# Patient Record
Sex: MALE | HISPANIC OR LATINO | Employment: UNEMPLOYED | URBAN - METROPOLITAN AREA
[De-identification: names, ages, dates, MRNs, and addresses within clinical notes are randomized per-mention and may not be internally consistent; named-entity substitution may affect disease eponyms.]

---

## 2022-09-05 ENCOUNTER — HOSPITAL ENCOUNTER (EMERGENCY)
Facility: MEDICAL CENTER | Age: 37
End: 2022-09-05
Attending: EMERGENCY MEDICINE
Payer: COMMERCIAL

## 2022-09-05 VITALS
BODY MASS INDEX: 25.71 KG/M2 | HEART RATE: 68 BPM | TEMPERATURE: 98.6 F | HEIGHT: 71 IN | DIASTOLIC BLOOD PRESSURE: 63 MMHG | RESPIRATION RATE: 16 BRPM | SYSTOLIC BLOOD PRESSURE: 113 MMHG | OXYGEN SATURATION: 98 % | WEIGHT: 183.64 LBS

## 2022-09-05 DIAGNOSIS — Z20.2 STD EXPOSURE: ICD-10-CM

## 2022-09-05 LAB
C TRACH DNA SPEC QL NAA+PROBE: NEGATIVE
N GONORRHOEA DNA SPEC QL NAA+PROBE: NEGATIVE
SPECIMEN SOURCE: NORMAL

## 2022-09-05 PROCEDURE — 96372 THER/PROPH/DIAG INJ SC/IM: CPT

## 2022-09-05 PROCEDURE — A9270 NON-COVERED ITEM OR SERVICE: HCPCS | Performed by: EMERGENCY MEDICINE

## 2022-09-05 PROCEDURE — 87591 N.GONORRHOEAE DNA AMP PROB: CPT

## 2022-09-05 PROCEDURE — 700102 HCHG RX REV CODE 250 W/ 637 OVERRIDE(OP): Performed by: EMERGENCY MEDICINE

## 2022-09-05 PROCEDURE — 99284 EMERGENCY DEPT VISIT MOD MDM: CPT

## 2022-09-05 PROCEDURE — 87491 CHLMYD TRACH DNA AMP PROBE: CPT

## 2022-09-05 PROCEDURE — 700111 HCHG RX REV CODE 636 W/ 250 OVERRIDE (IP): Performed by: EMERGENCY MEDICINE

## 2022-09-05 RX ORDER — DOXYCYCLINE 100 MG/1
100 CAPSULE ORAL 2 TIMES DAILY
Qty: 14 CAPSULE | Refills: 0 | Status: SHIPPED | OUTPATIENT
Start: 2022-09-05 | End: 2022-09-12

## 2022-09-05 RX ORDER — CEFTRIAXONE 500 MG/1
500 INJECTION, POWDER, FOR SOLUTION INTRAMUSCULAR; INTRAVENOUS ONCE
Status: COMPLETED | OUTPATIENT
Start: 2022-09-05 | End: 2022-09-05

## 2022-09-05 RX ORDER — DOXYCYCLINE 100 MG/1
100 TABLET ORAL ONCE
Status: COMPLETED | OUTPATIENT
Start: 2022-09-05 | End: 2022-09-05

## 2022-09-05 RX ADMIN — PENICILLIN G BENZATHINE 2.4 MILLION UNITS: 1200000 INJECTION, SUSPENSION INTRAMUSCULAR at 13:00

## 2022-09-05 RX ADMIN — CEFTRIAXONE SODIUM 500 MG: 500 INJECTION, POWDER, FOR SOLUTION INTRAMUSCULAR; INTRAVENOUS at 12:43

## 2022-09-05 RX ADMIN — DOXYCYCLINE 100 MG: 100 TABLET, FILM COATED ORAL at 12:45

## 2022-09-05 ASSESSMENT — LIFESTYLE VARIABLES: DO YOU DRINK ALCOHOL: NO

## 2022-09-05 NOTE — ED TRIAGE NOTES
Chief Complaint   Patient presents with    Exposure to STD     Known syphilis exposure and reported hx.     Patient to triage via ambulation, with a steady gait, patient A&O x4.  Appropriate precautions in place.     Explained wait time and triage process to pt. Pt placed back in lobby, told to notify ED tech or triage RN of any changes, verbalized understanding.

## 2022-09-05 NOTE — ED PROVIDER NOTES
ED Provider Note    Scribed for Rashad Cruz M.D. by Linette Gutiérrez. 9/5/2022, 12:14 PM.    Primary care provider: Pcp Pt States None  Means of arrival: Walk-in  History obtained from: Patient  History limited by: None    CHIEF COMPLAINT  Chief Complaint   Patient presents with    Exposure to STD     Known syphilis exposure and reported hx.       HPI  Meek Wood is a 37 y.o. male who presents to the Emergency Department for further evaluation  after a known syphilis exposure 1 week ago. The patient states he has gotten syphilis before. He denies any lesions, sores, bumps, or dysuria. He notes that he wants to be tested for other STD's and treated for them too. He denies any other med problems. No alleviating or exacerbating factors noted.      REVIEW OF SYSTEMS  Review of Systems   Genitourinary:         Known  syphilis exposure   All other systems reviewed and are negative.    PAST MEDICAL HISTORY   has a past medical history of Syphilis (2021).    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 No pertinent family history.    CURRENT MEDICATIONS  Home Medications       Reviewed by Hemalatha Cano R.N. (Registered Nurse) on 09/05/22 at 1136  Med List Status: Partial     Medication Last Dose Status   cyclobenzaprine (FLEXERIL) 10 MG TABS  Active   hydrocodone-acetaminophen (NORCO) 5-325 MG TABS per tablet  Active                    ALLERGIES  No Known Allergies    PHYSICAL EXAM  VITAL SIGNS: /58  "  Pulse 71   Temp 37.1 °C (98.8 °F) (Temporal)   Resp 16   Ht 1.803 m (5' 11\")   Wt 83.3 kg (183 lb 10.3 oz)   SpO2 98%   BMI 25.61 kg/m²   Vitals reviewed.  Constitutional: Well developed, Well nourished, No acute distress, Non-toxic appearance.   HENT: Normocephalic, Atraumatic,  Eyes: PERRL, EOMI, Conjunctiva normal, No discharge.   Neck: Normal range of motion, No tenderness  Cardiovascular: Normal heart rate, Normal rhythm, No murmurs,  Thorax & Lungs: Normal breath sounds, No respiratory distress, No wheezing,  Musculoskeletal: Good range of motion in all major joints.   Neurologic: Alert, No focal deficits noted.   Psychiatric: Affect normal    Results for orders placed or performed during the hospital encounter of 09/05/22   Chlamydia/GC PCR (Urine)    Specimen: Genital   Result Value Ref Range    Source Urine         RADIOLOGY  No orders to display     The radiologist's interpretation of all radiological studies have been reviewed by me.    COURSE & MEDICAL DECISION MAKING  Pertinent Labs & Imaging studies reviewed. (See chart for details)      12:14 PM Patient seen and examined at bedside. The patient presents with a known syphilis exposure, and the differential diagnosis includes but is not limited to syphilis, possible other STDs.  Ordered for Chlamydi\a/GC PCR (Urine) to evaluate. Patient will be treated with Rocephin 500 mg injection and Adoxa 100 mg tablet for his symptoms.      Patient is worked up for STDs per protocol.  Discussed this with pharmacy recommend Rocephin, Doxy, and penicillin.  These are ordered and given here in the ED.  The patient be discharged with a prescription for doxycycline for 7 days.  He is to return for any new symptoms or concerns.  Follow-up primary care for recommends he gets tested for additional STDs.    Also counseled back to safe sex and have partners tested.  Questions are answered is agreeable to plan and discharged in good condition.     The patient will " return for new or worsening symptoms and is stable at the time of discharge.      DISPOSITION:  Patient will be discharged home in stable condition.    FOLLOW UP:  [facility]  [address]  [place] [address]  Schedule an appointment as soon as possible for a visit in 2 days      OUTPATIENT MEDICATIONS:  Discharge Medication List as of 9/5/2022  1:09 PM        START taking these medications    Details   doxycycline (MONODOX) 100 MG capsule Take 1 Capsule by mouth 2 times a day for 7 days., Disp-14 Capsule, R-0, Normal              FINAL IMPRESSION  1. STD exposure          Linette JHAVERI (Scribe), am scribing for, and in the presence of, Rashad Cruz M.D..    Electronically signed by: Linette Gutiérrez (Matt), 9/5/2022    Rashad JHAVERI M.D. personally performed the services described in this documentation, as scribed by Linette Gutiérrez in my presence, and it is both accurate and complete.    The note accurately reflects work and decisions made by me.  Rashad Cruz M.D.  9/5/2022  1:33 PM

## 2022-09-05 NOTE — DISCHARGE INSTRUCTIONS
Medications as prescribed.  Practice safe sex.  Return to the emergency room for any new symptoms or concerns.  Follow-up with your doctor.  Consider further outpatient test for things like hepatitis and HIV.  Have partners checked for STDs.

## 2022-09-17 ENCOUNTER — HOSPITAL ENCOUNTER (EMERGENCY)
Facility: MEDICAL CENTER | Age: 37
End: 2022-09-17
Attending: EMERGENCY MEDICINE
Payer: COMMERCIAL

## 2022-09-17 VITALS
DIASTOLIC BLOOD PRESSURE: 66 MMHG | HEART RATE: 81 BPM | TEMPERATURE: 97.6 F | BODY MASS INDEX: 26.48 KG/M2 | OXYGEN SATURATION: 99 % | SYSTOLIC BLOOD PRESSURE: 121 MMHG | HEIGHT: 71 IN | WEIGHT: 189.15 LBS | RESPIRATION RATE: 12 BRPM

## 2022-09-17 DIAGNOSIS — Z20.2 POSSIBLE EXPOSURE TO STD: ICD-10-CM

## 2022-09-17 LAB
HIV 1+2 AB+HIV1 P24 AG SERPL QL IA: NORMAL
T PALLIDUM AB SER QL IA: REACTIVE

## 2022-09-17 PROCEDURE — 99283 EMERGENCY DEPT VISIT LOW MDM: CPT

## 2022-09-17 PROCEDURE — 87389 HIV-1 AG W/HIV-1&-2 AB AG IA: CPT

## 2022-09-17 PROCEDURE — 87591 N.GONORRHOEAE DNA AMP PROB: CPT

## 2022-09-17 PROCEDURE — 86592 SYPHILIS TEST NON-TREP QUAL: CPT

## 2022-09-17 PROCEDURE — 36415 COLL VENOUS BLD VENIPUNCTURE: CPT

## 2022-09-17 PROCEDURE — 87491 CHLMYD TRACH DNA AMP PROBE: CPT

## 2022-09-17 PROCEDURE — 86593 SYPHILIS TEST NON-TREP QUANT: CPT

## 2022-09-17 PROCEDURE — 86780 TREPONEMA PALLIDUM: CPT

## 2022-09-17 NOTE — LETTER
9/23/2022               Meek Israel  [address]  [facility] [address]        Dear Meek (MR#[id number])    As we have been unable to contact you by phone, this letter is sent in regards to your, recent visit to the [facility] Emergency Department on 9/17/2022. During the visit, tests were performed to assist the physician in your medical diagnosis. A review of your tests requires that we notify you of the following:    Your culture test was POSITIVE for Syphilis , a sexually transmitted infection, with an RPR titer of 1:2.      Should your symptoms progress, it is important that you follow up with your primary care physician, your local urgent care office, or return to the emergency department for further work up in order to prevent long term health issues.      Additionally, patients who are HIV negative and have been diagnosed with a sexually transmitted infection (STI) in the past 6 months are considered for PrEP.  PrEP stands for Pre-Exposure Prophylaxis. It is a once-daily pill regimen that can help you stay HIV-negative. When taken as prescribed, PrEP has been shown to be safe and highly effective against mahamed HIV. While PrEP does not protect against other sexually transmitted infections or unwanted pregnancy, it can be paired with condoms and several other prevention strategies for additional protection. We have a clinic here in [facility] that can help you obtain this medication, if interested please contact the number below.       Thank you for your cooperation in the matter.    Sincerely,  ED Culture Follow-Up Staff  Wyatt Osullivan, PharmD  [phone]    [facility] Emergency Department  [address], [place] [address] [phone] (ED Culture Line)

## 2022-09-18 NOTE — ED NOTES
"Pt discharged home. Pt in possession of belongings. Pt provided discharge education and information pertaining to medications and follow up appointments. Pt received copy of discharge instructions and verbalized understanding. /66   Pulse 81   Temp 36.4 °C (97.6 °F) (Temporal)   Resp 12   Ht 1.803 m (5' 11\")   Wt 85.8 kg (189 lb 2.5 oz)   SpO2 99%   BMI 26.38 kg/m²     "

## 2022-09-18 NOTE — ED TRIAGE NOTES
".  Chief Complaint   Patient presents with    Exposure to STD     Reports he had a sexual partner with known syphilis. Endorses penile burning.        38 yo male ambulatory to triage for above complaint. STI protocol ordered.     /66   Pulse 81   Temp 36.4 °C (97.6 °F) (Temporal)   Resp 12   Ht 1.803 m (5' 11\")   Wt 85.8 kg (189 lb 2.5 oz)   SpO2 99%   BMI 26.38 kg/m²     "

## 2022-09-18 NOTE — ED PROVIDER NOTES
ED Provider Note    ED Provider Note    Scribed for Marlee Leal MD by aMrlee Leal M.D.. 9/17/2022, 7:21 PM.    Primary care provider: Pcp Pt States None  Means of arrival: Private  History obtained from: Patient  History limited by: None    CHIEF COMPLAINT  Chief Complaint   Patient presents with    Exposure to STD     Reports he had a sexual partner with known syphilis. Endorses penile burning.        HPI  Meek Wood is a 37 y.o. male who presents to the Emergency Department for evaluation of possible exposure to sexually transmitted infection.  He relates partner found to be positive for syphilis, he has since been treated with penicillin.  Patient would like his titers rechecked today.  Notes sensation of fullness over his bladder but has no actual dysuria nor urinary drainage or bleeding.  No fever, no flank pain.  No vomiting.    REVIEW OF SYSTEMS  Pertinent positives include sensation of discomfort to bladder. Pertinent negatives include no dysuria, no urethral drainage, no fever, no bleeding.      PAST MEDICAL HISTORY   has a past medical history of Syphilis (2021).    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       FAMILY HISTORY  Noncontributory    CURRENT MEDICATIONS  Home Medications    **Home medications have not yet been reviewed for this encounter**         ALLERGIES  No Known Allergies    PHYSICAL EXAM  VITAL SIGNS: /66   Pulse 81   Temp 36.4 °C (97.6 °F) (Temporal)   Resp 12   Ht 1.803 m  "(5' 11\")   Wt 85.8 kg (189 lb 2.5 oz)   SpO2 99%   BMI 26.38 kg/m²     General: Alert, no acute distress  Skin: Warm, dry, normal for ethnicity  Head: Normocephalic, atraumatic  Neck: Trachea midline, no tenderness  Cardiovascular: Regular rate and rhythm, No murmur, Normal peripheral perfusion  Respiratory: Lungs CTA, respirations are non-labored, breath sounds are equal  Gastrointestinal: Soft, nontender, non distended  Musculoskeletal: No swelling, no deformity  Neurological: Alert and oriented to person, place, time, and situation  Lymphatics: No lymphadenopathy  Psychiatric: Cooperative, appropriate mood & affect      DIAGNOSTIC STUDIES/PROCEDURES    LABS  Sent and pending  All labs reviewed by me.      COURSE & MEDICAL DECISION MAKING  Pertinent Labs & Imaging studies reviewed. (See chart for details)    7:21 PM - Patient seen and examined at bedside. Ordered STI studies to evaluate his symptoms. The differential diagnoses include but are not limited to: Exposure to sexually transmitted infection    Patient Vitals for the past 24 hrs:   BP Temp Temp src Pulse Resp SpO2 Height Weight   09/17/22 1813 121/66 36.4 °C (97.6 °F) Temporal 81 12 99 % 1.803 m (5' 11\") 85.8 kg (189 lb 2.5 oz)        Decision Making:  This is a 37 y.o. year old male who presents with for reevaluation of positive syphilis titer.  He was previously treated with penicillin.  Awaiting RPR confirmation at this time.  He otherwise is nontoxic and well in appearance.   Labs are still pending at this time, no indication for penicillin at this time, patient is comfortable following her labs in the outpatient setting.  His partner has tested negative for HIV, hepatitis, chlamydia, and gonorrhea.    The patient will return for new or worsening symptoms and is stable at the time of discharge.      DISPOSITION:  Patient will be discharged home in stable condition.    FOLLOW UP:  Hernan Narayanan M.D.  [facility]  [facility] [place] [address]  [phone]    Schedule an appointment as soon as possible for a visit         OUTPATIENT MEDICATIONS:  Discharge Medication List as of 9/17/2022  7:43 PM             FINAL IMPRESSION  1. Possible exposure to STD          Marlee JHAVERI M.D. (Scribe), am scribing for, and in the presence of, Marlee Leal MD.    Electronically signed by: Marlee Leal M.D. (Scribe), 9/17/2022    IMarlee MD personally performed the services described in this documentation, as scribed by Marlee Leal M.D. in my presence, and it is both accurate and complete    The note accurately reflects work and decisions made by me.  Marlee Leal M.D.  9/17/2022  11:58 PM

## 2022-09-20 LAB
RPR SER QL: REACTIVE
RPR SER-TITR: ABNORMAL {TITER}

## 2022-09-23 NOTE — ED NOTES
"ED Positive Culture Follow-up/Notification Note:    Date: 9/23/2022     Patient seen in the ED on 9/17/2022 for rechecking syphilis titers after treatment on 9/5. Fullness over his bladder and penile burning. Negative for actual dysuria, urinary drainage, bleeding, flank pain, fever, and vomiting. Partner had tested positive for syphilis and negative for HIV, hepatitis, chlamydia, and gonorrhea. Patient tested negative for chlamydia and gonorrhea on 9/5 and received Bicillin on that date. Patient was discharged without antimicrobial treatment with syphilis labs pending.   1. Possible exposure to STD       Discharge Medication List as of 9/17/2022  7:43 PM          Allergies: Patient has no known allergies.     Vitals:    09/17/22 1813   BP: 121/66   Pulse: 81   Resp: 12   Temp: 36.4 °C (97.6 °F)   TempSrc: Temporal   SpO2: 99%   Weight: 85.8 kg (189 lb 2.5 oz)   Height: 1.803 m (5' 11\")       Final cultures:   Results       Procedure Component Value Units Date/Time    Chlamydia/GC, PCR (Urine) [[id number]] Collected: 09/17/22 1927    Order Status: Completed Specimen: Urine Updated: 09/18/22 2012     C. trachomatis by PCR Negative     Gc By Dna Probe Negative     Source Urine            Plan:   Treponemal test and RPR are reactive, and RPR titer is 1:2. There is no previous RPR titer in the patient's chart to compare to this one to confirm success of treatment, but patient has had previous syphilis infections. Left a voice mail with [facility] Department midday Friday asking for information on any previous titers. While 1:2 is not especially high, and patient does have a past history of syphilis but presented to the ED with complaint of penile burning, without further information I am inclined to treat.   Attempted to call and discuss with patient but left a brief voice mail asking for a call back. Will send a letter in [employer] and await follow up from patient or [facility].    Wyatt Osullivan, PharmD   "

## 2022-09-26 ENCOUNTER — HOSPITAL ENCOUNTER (EMERGENCY)
Facility: MEDICAL CENTER | Age: 37
End: 2022-09-26
Payer: COMMERCIAL

## 2022-09-26 VITALS
OXYGEN SATURATION: 97 % | HEART RATE: 95 BPM | DIASTOLIC BLOOD PRESSURE: 64 MMHG | RESPIRATION RATE: 18 BRPM | WEIGHT: 184.97 LBS | SYSTOLIC BLOOD PRESSURE: 114 MMHG | BODY MASS INDEX: 25.9 KG/M2 | TEMPERATURE: 98.2 F | HEIGHT: 71 IN

## 2022-09-26 LAB
HIV 1+2 AB+HIV1 P24 AG SERPL QL IA: NORMAL
T PALLIDUM AB SER QL IA: REACTIVE

## 2022-09-26 PROCEDURE — 302449 STATCHG TRIAGE ONLY (STATISTIC)

## 2022-09-26 PROCEDURE — 86592 SYPHILIS TEST NON-TREP QUAL: CPT

## 2022-09-26 PROCEDURE — 86780 TREPONEMA PALLIDUM: CPT

## 2022-09-26 PROCEDURE — 86593 SYPHILIS TEST NON-TREP QUANT: CPT

## 2022-09-26 PROCEDURE — 87389 HIV-1 AG W/HIV-1&-2 AB AG IA: CPT

## 2022-09-27 NOTE — ED NOTES
Pt decided to leave AMA. Told it was best to stay and see an ERP. Pt decided to leave regardless. AMA form signed

## 2022-09-27 NOTE — ED TRIAGE NOTES
"Chief Complaint   Patient presents with    Lab Follow-up     Pt reports he currently has syphillis and wants to be rechecked       Pt to triage with steady gait for above complaint.     Pt presents in triage requesting STD check up, pt was previously positive for syphillis and would like a follow up check    Pt back to lobby, educated on triage process and encourage to alert staff of any changes.     /64   Pulse 95   Temp 36.8 °C (98.2 °F) (Temporal)   Resp 18   Ht 1.803 m (5' 11\")   Wt 83.9 kg (184 lb 15.5 oz)   SpO2 97%   BMI 25.80 kg/m²      "

## 2022-09-28 LAB — RPR SER QL: REACTIVE

## 2022-09-29 LAB — RPR SER-TITR: ABNORMAL {TITER}

## 2022-09-30 ENCOUNTER — HOSPITAL ENCOUNTER (EMERGENCY)
Facility: MEDICAL CENTER | Age: 37
End: 2022-09-30
Attending: EMERGENCY MEDICINE
Payer: COMMERCIAL

## 2022-09-30 VITALS
RESPIRATION RATE: 16 BRPM | WEIGHT: 186.95 LBS | TEMPERATURE: 97.7 F | HEART RATE: 83 BPM | SYSTOLIC BLOOD PRESSURE: 125 MMHG | HEIGHT: 71 IN | OXYGEN SATURATION: 98 % | DIASTOLIC BLOOD PRESSURE: 74 MMHG | BODY MASS INDEX: 26.17 KG/M2

## 2022-09-30 DIAGNOSIS — A53.9 SYPHILIS: ICD-10-CM

## 2022-09-30 PROCEDURE — 96372 THER/PROPH/DIAG INJ SC/IM: CPT

## 2022-09-30 PROCEDURE — 99283 EMERGENCY DEPT VISIT LOW MDM: CPT

## 2022-09-30 PROCEDURE — 700111 HCHG RX REV CODE 636 W/ 250 OVERRIDE (IP): Performed by: EMERGENCY MEDICINE

## 2022-09-30 RX ADMIN — PENICILLIN G BENZATHINE 2.4 MILLION UNITS: 1200000 INJECTION, SUSPENSION INTRAMUSCULAR at 19:24

## 2022-09-30 ASSESSMENT — LIFESTYLE VARIABLES: DO YOU DRINK ALCOHOL: NO

## 2022-10-01 NOTE — ED PROVIDER NOTES
ED Provider Note    Scribed for Akbar Ramirez M.D. by Rashard Honeycutt. 9/30/2022, 6:26 PM.    Primary care provider: Pcp Pt States None  Means of arrival: Walk-In  History obtained from: Patient  History limited by: None    CHIEF COMPLAINT  Chief Complaint   Patient presents with    Exposure to STD     Pt is here with his partner, he has been being treated for syphilis but the infection isnt cleared yet        HPI  Meek Wood is a 37 y.o. male who presents to the Emergency Department for a potential syphilis exposure. Patient did have a previous STD exposure, was treated and has had his titers tracked which he states are still high.  Patient's partner is here with him.  She is pregnant.  They would like to both be treated again.  Patient reports no new skin rashes or sores. He does report having associated headache and nausea. Patient states his headache began gradually at work. There are no alleviating or exacerbating factors. He denies associated skin rash, sores, vomiting, fever, or chills.     REVIEW OF SYSTEMS  Pertinent positives include nausea, headache, syphilis exposure. Pertinent negatives include skin rash, sores, vomiting, fever, or chills.     PAST MEDICAL HISTORY   has a past medical history of Syphilis (2021).    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 No pertinent family history.    CURRENT MEDICATIONS  Current  "Outpatient Medications   Medication Instructions    cyclobenzaprine (FLEXERIL) 10 mg, Oral, 3 TIMES DAILY PRN    hydrocodone-acetaminophen (NORCO) 5-325 MG TABS per tablet 1-2 Tablets, Oral, EVERY 6 HOURS PRN      ALLERGIES  No Known Allergies    PHYSICAL EXAM  VITAL SIGNS: /73   Pulse 84   Temp 36.5 °C (97.7 °F) (Temporal)   Resp 16   Ht 1.803 m (5' 11\")   Wt 84.8 kg (186 lb 15.2 oz)   SpO2 96%   BMI 26.07 kg/m²     Constitutional: Well developed, Well nourished, no acute distress.   HENT: Normocephalic, Atraumatic, mask in place.  Eyes: Conjunctiva normal, No discharge.   Cardiovascular: Normal heart rate, Normal rhythm, No murmurs, equal pulses.   Pulmonary: Normal breath sounds, No respiratory distress, No wheezing, No rales, No rhonchi.  Musculoskeletal: No major deformities noted, No tenderness.   Skin: Warm, Dry, No erythema, No rash.   Neurologic: Alert & oriented x 3, Normal motor function,  No focal deficits noted.   Psychiatric: Affect normal, Judgment normal, Mood normal.     COURSE & MEDICAL DECISION MAKING  Pertinent Labs & Imaging studies reviewed. (See chart for details)    7:05 PM - Patient seen and examined at bedside. Patient will be treated with Bicillin-LA 2.4 million units. The differential diagnoses include but are not limited to: syphilis. Discussed the patient's case with pharmacy. Plan for the patient to be treated for syphilis. Furthermore, the patient will follow up with the health department in a week for additional testing as their titer may remain high. He is afebrile and vital signs are stable. Advised patient to follow up with his primary care physician and the health department. I then informed the patient of my plan for discharge, which includes strict return precautions for any new or worsening symptoms. Patient understands and verbalizes agreement to plan of care. They were given an opportunity to ask questions. Patient is comfortable going home at this time.  "     Medical Decision Making: Patient presents with continued elevated titers.  This may be just that his antibodies are still going down but to be certain that both he and his partner treated at the same time given the fact that his partner is pregnant we will treat both patients with penicillin once again.  I will have them follow-up in the health department.    The patient will return for new or worsening symptoms and is stable at the time of discharge.    The patient is referred to a primary physician for blood pressure management, diabetic screening, and for all other preventative health concerns.    DISPOSITION:  Patient will be discharged home in stable condition.    FOLLOW UP:  [address] [id number] [phone]  Schedule an appointment as soon as possible for a visit in 1 week  To follow-up on your results    FINAL IMPRESSION  1. Syphilis          Rashard JHAVERI (Matt), am scribing for, and in the presence of, Akbar Ramirez M.D.    Electronically signed by: Rashard Honeycutt (Matt), 9/30/2022    Akbar JHAVERI M.D. personally performed the services described in this documentation, as scribed by Rashard Honeycutt in my presence, and it is both accurate and complete.    The note accurately reflects work and decisions made by me.  Akbar Ramirez M.D.  9/30/2022  8:58 PM

## 2022-10-01 NOTE — ED TRIAGE NOTES
Meek Wood  37 y.o. male  Chief Complaint   Patient presents with    Exposure to STD     Pt is here with his partner, he has been being treated for syphilis but the infection isnt cleared yet        Vitals:    09/30/22 1741   BP: 121/73   Pulse: 84   Resp: 16   Temp: 36.5 °C (97.7 °F)   SpO2: 96%       Triage process explained to patient, apologized for wait time, and returned to lobby.  Pt informed to notify staff of any change in condition.

## 2022-10-03 NOTE — ED NOTES
"ED Positive Culture Follow-up/Notification Note:    Date: 10/2/2022     Patient seen in the ED on 9/26/2022 for syphilis titer follow up. Patient's partner is positive for syphilis. Patient presented to the ED on9/5 for treatment after exposure, received a dose of Bicillin. Negative at that time for HIV, gonorrhea, and chlamydia. RPR titer 1:2. Return on 9/17 for same concern, no treatment or labs taken. A third time on 9/26 saw labs drawn but patient left AMA. No diagnosis found.   Discharge Medication List as of 9/26/2022  8:14 PM          Allergies: Patient has no known allergies.     Vitals:    09/26/22 1929 09/26/22 1953   BP: 114/64    Pulse: 95    Resp: 18    Temp: 36.8 °C (98.2 °F)    TempSrc: Temporal    SpO2: 97%    Weight:  83.9 kg (184 lb 15.5 oz)   Height:  1.803 m (5' 11\")       Final cultures:   Results       Procedure Component Value Units Date/Time    Chlamydia/GC, PCR (Urine) [[id number]] Collected: 09/26/22 0000    Order Status: Canceled Specimen: Urine             Plan:   Patient returned to ED on 9/30 after RPR titer taken 9/26 came back at 1:2. Patient on 9/30 was afebrile, no new rashes or sores. Patient was given a dose of Bicillin for persistent titer and given instructions to follow up with [facility] Department. Voice mail left with Danica at [facility] asking about information on history or follow up.    Wyatt Osullivan, PharmD     Addendum 10/13/2022  Patient returned to ED on 9/30/22 along with partner and received another dose of Bicillin 2.4 million units x1. On 10/6/22, discussed case with Rashard Hollis from the [facility] Department. He stated that while this patient does continue to engage in activities with high risk for syphilis, to his knowledge he has been appropriately treated and does not need any further treatment. He is reassured that the patient needs no further treatment given their RPR is stable at 1:2 and has not increased.   No further followup " required at this time.  Cassia Jacobsen, PharmD, BCPS  PGY2 Infectious Diseases Pharmacy Clinical Specialist

## 2022-10-22 ENCOUNTER — HOSPITAL ENCOUNTER (EMERGENCY)
Facility: MEDICAL CENTER | Age: 37
End: 2022-10-22
Attending: EMERGENCY MEDICINE
Payer: COMMERCIAL

## 2022-10-22 VITALS
HEIGHT: 71 IN | WEIGHT: 186.51 LBS | BODY MASS INDEX: 26.11 KG/M2 | TEMPERATURE: 97.6 F | DIASTOLIC BLOOD PRESSURE: 64 MMHG | SYSTOLIC BLOOD PRESSURE: 111 MMHG | OXYGEN SATURATION: 97 % | HEART RATE: 85 BPM | RESPIRATION RATE: 15 BRPM

## 2022-10-22 DIAGNOSIS — Z86.19 HISTORY OF SYPHILIS: ICD-10-CM

## 2022-10-22 LAB — HIV 1+2 AB+HIV1 P24 AG SERPL QL IA: NORMAL

## 2022-10-22 PROCEDURE — 87389 HIV-1 AG W/HIV-1&-2 AB AG IA: CPT

## 2022-10-22 PROCEDURE — 99283 EMERGENCY DEPT VISIT LOW MDM: CPT

## 2022-10-22 PROCEDURE — 36415 COLL VENOUS BLD VENIPUNCTURE: CPT

## 2022-10-22 PROCEDURE — 87491 CHLMYD TRACH DNA AMP PROBE: CPT

## 2022-10-22 PROCEDURE — 87591 N.GONORRHOEAE DNA AMP PROB: CPT

## 2022-10-23 NOTE — ED PROVIDER NOTES
ED Provider Note    Scribed for Emily Barrientos M.D. by Terra Mart. 10/22/2022  6:53 PM    Means of arrival: walk in  History of obtained from: patient  History limited by: none    CHIEF COMPLAINT  Chief Complaint   Patient presents with    Follow-Up     Pt states he was diagnosed one year ago with syphilis and treated but states 'I just want to know what my syphilis levels are now', pt denies any symptoms, has no complaints.         HPI  Meek Wood is a 37 y.o. male who presents to the Emergency Department for STD exposure follow up. Patient states he was treated for syphilis 1 month ago and is returning to get blood work done to see if it is gone. States he needs his titer repeated, has not followed up with the health department as directed.  Patient states he has had some nausea and a headache but denies any discharge, dysuria or genital lesions.    REVIEW OF SYSTEMS  Pertinent positives include nausea, headache. Pertinent negative include no discharge, dysuria or redness. All other systems are negative.     PAST MEDICAL HISTORY   has a past medical history of Syphilis (2021).    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 (Registered Nurse) on 10/22/22 at 1731  Med List Status: Not Addressed     Medication Last Dose Status   cyclobenzaprine (FLEXERIL) 10 MG TABS  Active   hydrocodone-acetaminophen (NORCO) 5-325 MG TABS per tablet  Active                    ALLERGIES  No Known  "Allergies    PHYSICAL EXAM  VITAL SIGNS: /64   Pulse 85   Temp 36.4 °C (97.6 °F) (Temporal)   Resp 15   Ht 1.803 m (5' 11\")   Wt 84.6 kg (186 lb 8.2 oz)   SpO2 97%   BMI 26.01 kg/m²    Constitutional: Nontoxic appearing, alert in no apparent distress.  HENT: Normocephalic, Atraumatic. Bilateral external ears normal. Nose normal. Moist mucous membranes.  Neck: Supple, full range of motion.  Eyes: Pupils are equal and reactive. Conjunctiva normal.   Skin: Warm, Dry. No rash.   Musculoskeletal: Atraumatic, no deformities noted.   Neurologic: Alert and oriented. Moving all extremities spontaneously  Psychiatric: Affect normal, Mood normal. Appears appropriate and not intoxicated.     DIAGNOSTIC STUDIES    LABS  Personally reviewed by me  Labs Reviewed   CHLAMYDIA/GC, PCR (URINE)   HIV AG/AB COMBO ASSAY SCREENING     ED COURSE  Vitals:    10/22/22 1722 10/22/22 1727   BP: 111/64    Pulse: 85    Resp: 15    Temp: 36.4 °C (97.6 °F)    TempSrc: Temporal    SpO2: 97%    Weight:  84.6 kg (186 lb 8.2 oz)   Height:  1.803 m (5' 11\")         Medications administered:  Medications - No data to display      Old records personally reviewed:  Patient has been seen 3 times over the last 6 weeks with similar complaints.  He has been treated twice with penicillin since that time.  Last RPR titer was performed on 9/17/22 and 9/26/22 both 1:2        MEDICAL DECISION MAKING  6:53 PM Patient seen and examined at bedside. The patient presents with STD follow up wanting syphilis titers rechecked following treatment.  His last titer was elevated on 17 September and he was retreated at that time.  No new concerning symptoms.  Plan to draw further labs for RPR titer however the patient left prior to this being performed as he states he was taking too long.      DISPOSITION:  Patient will be discharged home in stable condition.    FOLLOW UP:  [address]  [place] [address]  [phone]    Schedule an " appointment as soon as possible for a visit   to follow up and make sure sufficient treatment    [facility], Emergency Dept  [address] [facility] [address] [phone]    If symptoms worsen    OUTPATIENT MEDICATIONS:  Discharge Medication List as of 10/22/2022  7:31 PM          IMPRESSION  (Z86.19) History of syphilis    Results, diagnoses, and treatment options were discussed with the patient and/or family. Patient verbalized understanding of plan of care and strict return precautions prior to discharge.         Terra JHAVERI (Matt), am scribing for, and in the presence of, Emily Barrientos M.D..    Electronically signed by: Terra Mart (Matt), 10/22/2022    Emily JHAVERI M.D. personally performed the services described in this documentation, as scribed by Terra Mart in my presence, and it is both accurate and complete.      The note accurately reflects work and decisions made by me.  Emily Barrientos M.D.  10/22/2022  11:57 PM

## 2022-10-23 NOTE — ED NOTES
"Pt stated \"this is taking too long, we are leaving\",  Pt left ambulatory in NAD, did not want to wait for discharge instructions    "

## 2022-10-23 NOTE — ED TRIAGE NOTES
"Chief Complaint   Patient presents with    Follow-Up     Pt states he was diagnosed one year ago with syphilis and treated but states 'I just want to know what my syphilis levels are now', pt denies any symptoms, has no complaints.       Pt ambulatory to triage for above complaints, VSS on RA, GCS 15, NAD.    [facility] Educated on triage process and to inform staff of any changes.     /64   Pulse 85   Temp 36.4 °C (97.6 °F) (Temporal)   Resp 15   Ht 1.803 m (5' 11\")   Wt 84.6 kg (186 lb 8.2 oz)   SpO2 97%   BMI 26.01 kg/m²     "

## 2022-11-06 ENCOUNTER — HOSPITAL ENCOUNTER (EMERGENCY)
Facility: MEDICAL CENTER | Age: 37
End: 2022-11-06
Attending: EMERGENCY MEDICINE
Payer: COMMERCIAL

## 2022-11-06 VITALS
TEMPERATURE: 97.8 F | WEIGHT: 189.6 LBS | BODY MASS INDEX: 26.54 KG/M2 | HEIGHT: 71 IN | OXYGEN SATURATION: 96 % | HEART RATE: 72 BPM | RESPIRATION RATE: 14 BRPM | SYSTOLIC BLOOD PRESSURE: 116 MMHG | DIASTOLIC BLOOD PRESSURE: 68 MMHG

## 2022-11-06 DIAGNOSIS — Z72.51 UNPROTECTED SEXUAL INTERCOURSE: ICD-10-CM

## 2022-11-06 DIAGNOSIS — R59.1 LYMPHADENOPATHY: ICD-10-CM

## 2022-11-06 LAB
HIV 1+2 AB+HIV1 P24 AG SERPL QL IA: NORMAL
S PYO DNA SPEC NAA+PROBE: NOT DETECTED
T PALLIDUM AB SER QL IA: REACTIVE

## 2022-11-06 PROCEDURE — 87491 CHLMYD TRACH DNA AMP PROBE: CPT

## 2022-11-06 PROCEDURE — 87389 HIV-1 AG W/HIV-1&-2 AB AG IA: CPT

## 2022-11-06 PROCEDURE — 86593 SYPHILIS TEST NON-TREP QUANT: CPT

## 2022-11-06 PROCEDURE — 99283 EMERGENCY DEPT VISIT LOW MDM: CPT

## 2022-11-06 PROCEDURE — 87591 N.GONORRHOEAE DNA AMP PROB: CPT

## 2022-11-06 PROCEDURE — 86592 SYPHILIS TEST NON-TREP QUAL: CPT

## 2022-11-06 PROCEDURE — 86780 TREPONEMA PALLIDUM: CPT

## 2022-11-06 PROCEDURE — 87651 STREP A DNA AMP PROBE: CPT

## 2022-11-06 PROCEDURE — 36415 COLL VENOUS BLD VENIPUNCTURE: CPT

## 2022-11-06 NOTE — ED TRIAGE NOTES
Chief Complaint   Patient presents with    Swollen Glands     Pt started having swelling to neck around 2000 to bilateral sides of neck     Pt came into Er for above CC, pt started having swelling on his neck bilaterally underneath his jaw around 2000. Pt denies any SOB, difficulty swallowing or breathing.     Pt is alert and oriented, speaking in full sentences, follows commands and responds appropriately to questions.      Pt placed in lobby. Pt educated on triage process and apologized for wait time. Pt encouraged to alert staff for any changes.     Patient and staff wearing appropriate PPE.    Vitals:    11/06/22 0005   BP: 132/85   Pulse: 94   Resp: 14   Temp: 36.3 °C (97.4 °F)   SpO2: 95%

## 2022-11-06 NOTE — ED NOTES
Goal Outcome Evaluation:      Plan of Care Reviewed With: patient           A&OX2 not oriented to time and situation. Pt was lethergic earlier during shift. Became more awake and alert later on. VSs stable. Bed alarm on. Got up to use the bathroom once and was assited back to bed by staff. 1 BM this shift. Refused all medications. Bed changed and pt cleaned this shift. No c/o pain. refused patch.        Rounded on Pt.    Updated Pt on plan of care. Pt verbalized understanding.  No acute distress at this time.  Will continue to monitor.

## 2022-11-06 NOTE — ED NOTES
Pt updated by MD.    Pt left prior to providing discharge paperwork. [facility] states [facility].

## 2022-11-06 NOTE — ED PROVIDER NOTES
ED Provider Note    CHIEF COMPLAINT  Chief Complaint   Patient presents with    Swollen Glands     Pt started having swelling to neck around 2000 to bilateral sides of neck       HPI  Meek Farrar a 37-year-old male who presents emergency department chief complaint of swollen glands.  The patient states that he ate Chinese food and had intercourse with a new woman this evening about 40 minutes after that he did notice he had some swollen glands in the front.  He denies difficulty swallowing change in his voice rash shortness of breath nausea vomiting pain fevers chills cough congestion anything.  He is little concerned he had some STD exposure he was treated for syphilis a couple months ago and just wants to make sure that everything is okay.  Currently he states is not in any pain he just feels a little anxious.      REVIEW OF SYSTEMS  Positives as above. Pertinent negatives include nausea vomiting fevers chills sore throat difficulty swallowing fevers cough congestion  All other review of systems are negative    PAST MEDICAL HISTORY   has a past medical history of Syphilis (2021).    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 (Registered Nurse) on 11/06/22 at 0012  Med List Status: Partial     Medication Last Dose Status   cyclobenzaprine (FLEXERIL) 10 MG TABS  Active   hydrocodone-acetaminophen (NORCO) 5-325 MG TABS per tablet  Active                    ALLERGIES  No Known  "Allergies    PHYSICAL EXAM  VITAL SIGNS: /85   Pulse 94   Temp 36.3 °C (97.4 °F) (Temporal)   Resp 14   Ht 1.803 m (5' 11\")   Wt 86 kg (189 lb 9.5 oz)   SpO2 95%   BMI 26.44 kg/m²    Pulse ox interpretation: I interpret this pulse ox as normal.  Constitutional: Alert in no apparent distress.  HENT: Normocephalic atraumatic, MMM, no trismus no tonsillar exudates or erythema questionable mild uvular swelling but patient denies any discomfort no swelling of the tongue anterior cervical lymphadenopathy bilaterally  Eyes: PER, Conjunctiva normal, Non-icteric.   Neck: Normal range of motion, No tenderness, Supple, No stridor.   Cardiovascular: Regular rate and rhythm, no murmurs.   Thorax & Lungs: Normal breath sounds, No respiratory distress, No wheezing, No chest tenderness.   Abdomen: Bowel sounds normal, Soft, No tenderness, No pulsatile masses. No peritoneal signs.  Skin: Warm, Dry, No erythema, No rash.   Back: No bony tenderness, No CVA tenderness.   Extremities/MSK: Intact equal distal pulses, No edema, No tenderness, No cyanosis, no major deformities noted  Neurologic: Alert and oriented x3, No focal deficits noted.       DIFFERENTIAL DIAGNOSIS AND WORK UP PLAN    This is a 37 y.o. male who presents with the emerge department with chief complaint of cervical and adenopathy began tonight, there is questionable mild uvular swelling but he is not complaining of any actual pain in his throat itching or difficulty swallowing so I doubt an allergic reaction this could be early viral syndrome but I doubt strep throat is not complaining of any pain.  No trismus no concern for deep space neck infection.  His biggest concern is that he got exposed to an STD this evening.  He has had syphilis in the past he is requesting that we test him again.  I did order a strep test COVID test GC CT syphilis    DIAGNOSTIC STUDIES / PROCEDURES    EKG  Results for orders placed or performed during the hospital encounter of " "12/18/14   EKG (ER)   Result Value Ref Range    Report SINUS RHYTHM     Report ST ELEV, PROBABLE NORMAL EARLY REPOL PATTERN     Report BASELINE WANDER IN LEAD(S) V6        LABS  Pertinent Lab Findings    Labs Reviewed   CHLAMYDIA/GC, PCR (URINE)   GROUP A STREP BY PCR   HIV AG/AB COMBO ASSAY SCREENING   T.PALLIDUM AB GLENDY (SCREENING)       RADIOLOGY  No orders to display     The radiologist's interpretation of all radiological studies have been reviewed by me.      COURSE & MEDICAL DECISION MAKING  Pertinent Labs & Imaging studies reviewed. (See chart for details)    2:42 AM  Unfortunately we had both epic downtime as well as daylight savings time and so laboratory analysis was very delayed in getting sense at this time the patient is wishing to leave despite lack of results for any of his test that were ordered.  He has not had any change in his physical examination there is no worsening swelling face usually there is no signs of other allergic reaction he does still have mild lymphadenopathy told him to take ibuprofen Tylenol as needed return to the ED for difficulty swallowing worsening swelling.  He understands feels comfortable going home he will follow-up with his laboratory and also MyChart or return if he has any positive bacterial infection    /68   Pulse 72   Temp 36.6 °C (97.8 °F) (Temporal)   Resp 14   Ht 1.803 m (5' 11\")   Wt 86 kg (189 lb 9.5 oz)   SpO2 96%   BMI 26.44 kg/m²       I verified that the patient was wearing a mask and I was wearing appropriate PPE every time I entered the room. The patient's mask was on the patient at all times during my encounter except for a brief view of the oropharynx.    The patient will return for new or worsening symptoms and is stable at the time of discharge.    The patient is referred to a primary physician for blood pressure management, diabetic screening, and for all other preventative health concerns.    DISPOSITION:  Patient will be discharged " home in stable condition.    FOLLOW UP:  [facility], Emergency Dept  [address] [facility]  [place] [address] [phone]    If symptoms worsen - or if any of your tests come up positive in mychart      OUTPATIENT MEDICATIONS:  New Prescriptions    No medications on file           FINAL IMPRESSION  1. Lymphadenopathy        2. Unprotected sexual intercourse                Electronically signed by: Flory Perez M.D., 11/6/2022 12:19 AM    This dictation has been created using voice recognition software and/or scribes. The accuracy of the dictation is limited by the abilities of the software and the expertise of the scribes. I expect there may be some errors of grammar and possibly content. I made every attempt to manually correct the errors within my dictation. However, errors related to voice recognition software and/or scribes may still exist and should be interpreted within the appropriate context.

## 2022-11-08 LAB
C TRACH DNA SPEC QL NAA+PROBE: NEGATIVE
N GONORRHOEA DNA SPEC QL NAA+PROBE: NEGATIVE
RPR SER QL: REACTIVE
RPR SER-TITR: ABNORMAL {TITER}
SPECIMEN SOURCE: NORMAL

## 2022-11-12 NOTE — ED NOTES
"ED Positive Culture Follow-up/Notification Note:    Date: 11/11/2022     Patient seen in the ED on 11/6/2022 for cervical adenopathy. Patient states he ate Chinese food and had intercourse with a new woman this evening and 40 minutes later began noticing swollen cervical glands. Denies fever, chills, diarrhea, vomiting, difficulty speak, or difficulty swallowing. Patient has a history of syphilis, including recent visits to this ED. He tested positive on 9/17 and 9/26 with RPR titers 1:2. Patient has been instructed in the past to follow up with [facility] but has not done so. Rashard Hollis with [facility] had been appropriately treated as of 10/6, receiving a dose of Bicillin on 9/30. STI screen taken. Patient was discharged without antibiotics.  1. Lymphadenopathy    2. Unprotected sexual intercourse       Discharge Medication List as of 11/6/2022  3:56 AM          Allergies: Patient has no known allergies.     Vitals:    11/06/22 0005 11/06/22 0010 11/06/22 0223   BP: 132/85  116/68   Pulse: 94  72   Resp: 14  14   Temp: 36.3 °C (97.4 °F)  36.6 °C (97.8 °F)   TempSrc: Temporal  Temporal   SpO2: 95%  96%   Weight:  86 kg (189 lb 9.5 oz)    Height: 1.803 m (5' 11\") 1.803 m (5' 11\")        Final cultures:   Results       Procedure Component Value Units Date/Time    Chlamydia/GC, PCR (Urine) [[id number]] Collected: 11/06/22 0215    Order Status: Completed Updated: 11/08/22 1323     C. trachomatis by PCR Negative     Gc By Dna Probe Negative     Source Urine    Group A Strep by PCR [[id number]] Collected: 11/06/22 0215    Order Status: Completed Updated: 11/06/22 0311     Group A Strep by PCR Not Detected            Plan:   Patient is negative for chlamydia, gonorrhea, and HIV. Patient is positive for syphilis, with positive treponemal test and RPR; titer once again 1:2. However, patient has swollen lymph nodes, a symptoms of syphilis. Regardless of RPR, this should be treated as " a new infection with Bicillin 2.4 million units. I spoke with the patient over the phone, and he still has swollen lymph nodes. He intends to go to the [facility] for follow up. Left a voice mail for Danica at [facility].    Wyatt Osullivan, ColtonD

## 2022-12-04 ENCOUNTER — HOSPITAL ENCOUNTER (EMERGENCY)
Facility: MEDICAL CENTER | Age: 37
End: 2022-12-04
Attending: EMERGENCY MEDICINE
Payer: COMMERCIAL

## 2022-12-04 VITALS
OXYGEN SATURATION: 99 % | DIASTOLIC BLOOD PRESSURE: 81 MMHG | HEART RATE: 80 BPM | SYSTOLIC BLOOD PRESSURE: 140 MMHG | TEMPERATURE: 96.7 F | HEIGHT: 71 IN | BODY MASS INDEX: 26.48 KG/M2 | WEIGHT: 189.15 LBS | RESPIRATION RATE: 16 BRPM

## 2022-12-04 DIAGNOSIS — Z20.2 STD EXPOSURE: ICD-10-CM

## 2022-12-04 LAB
HIV 1+2 AB+HIV1 P24 AG SERPL QL IA: NORMAL
T PALLIDUM AB SER QL IA: REACTIVE

## 2022-12-04 PROCEDURE — 87389 HIV-1 AG W/HIV-1&-2 AB AG IA: CPT

## 2022-12-04 PROCEDURE — 86592 SYPHILIS TEST NON-TREP QUAL: CPT

## 2022-12-04 PROCEDURE — 87491 CHLMYD TRACH DNA AMP PROBE: CPT

## 2022-12-04 PROCEDURE — 302449 STATCHG TRIAGE ONLY (STATISTIC)

## 2022-12-04 PROCEDURE — 86593 SYPHILIS TEST NON-TREP QUANT: CPT

## 2022-12-04 PROCEDURE — 87591 N.GONORRHOEAE DNA AMP PROB: CPT

## 2022-12-04 PROCEDURE — 86780 TREPONEMA PALLIDUM: CPT

## 2022-12-04 ASSESSMENT — LIFESTYLE VARIABLES: DO YOU DRINK ALCOHOL: NO

## 2022-12-04 NOTE — LETTER
12/7/2022               Meek Wood  [address] [facility] [place] [address]        Dear Meek (MR#[id number]):    As we have been unable to contact you by phone, this letter is sent in regards to your recent visit to the [facility] Emergency Department on 12/4/2022. During the visit, tests were performed to assist the physician in a medical diagnosis. A review of those tests requires that we notify you of the following:    Your culture and sensitivity result was POSITIVE for a sexually transmitted bacteria - Syphilis. Given your history of infection, this test alone cannot determine whether or not this is an active infection.       Based on the above findings, it is important that you follow up with your primary care physician. Also, it is advised that you inform your sexual partner(s) within the previous 60 days of the above findings and direct them to the Health Department to test for sexually transmitted diseases.        Additionally, patients who are HIV negative and have been diagnosed with a sexually transmitted infection (STI) in the past 6 months are considered for PrEP.  PrEP stands for Pre-Exposure Prophylaxis. It is a once-daily pill regimen that can help you stay HIV-negative. When taken as prescribed, PrEP has been shown to be safe and highly effective against mahamed HIV. While PrEP does not protect against other sexually transmitted infections or unwanted pregnancy, it can be paired with condoms and several other prevention strategies for additional protection. We have a clinic here in [facility] that can help you obtain this medication, if interested please contact the number below.       Thank you for your cooperation in the matter.    Sincerely,  ED Culture Follow-Up Staff  Wyatt Osullivan, ColtonD  [phone]    [facility] Emergency Department  [address] [place] [address] [phone] (ED Culture Line)

## 2022-12-05 NOTE — ED PROVIDER NOTES
ED Provider Note    CHIEF COMPLAINT  Chief Complaint   Patient presents with    Exposure to STD     Pt wants to get check for HIV, chlamydia and gonorrhea. States he's having a lot of anxiety about it        HPI  Patient presents emergency room for evaluation of concerns regarding exposure for possible STD.  I came to the bedside the patient had already eloped.  He signed paperwork stating that he had received labs in triage and not yet been evaluated by a physician.  Lab work has not resulted to this patient came back and I am unable to perform a review of systems or examine the patient prior to him leaving prior to completion of the medical work-up.  Labs below are currently collected in triage and pending.    PPE Note: I personally donned full PPE for all patient encounters during this visit, including being clean-shaven with an N95 respirator mask, gloves, and goggles.     REVIEW OF SYSTEMS  Unable to obtain as the patient left prior to assessment    PAST MEDICAL HISTORY   has a past medical history of Syphilis (2021).    CURRENT MEDICATIONS  Home Medications       Reviewed by Melody Osborne R.N. (Registered Nurse) on 12/04/22 at 1852  Med List Status: Not Addressed     Medication Last Dose Status   cyclobenzaprine (FLEXERIL) 10 MG TABS  Active   hydrocodone-acetaminophen (NORCO) 5-325 MG TABS per tablet  Active                  ALLERGIES  No Known Allergies    LABS  Labs Reviewed   CHLAMYDIA/GC, PCR (URINE)   T.PALLIDUM AB GLENDY (SCREENING)   HIV AG/AB COMBO ASSAY SCREENING     Electronically signed by: Cyrus Hansen M.D., 12/4/2022 7:14 PM

## 2022-12-05 NOTE — ED NOTES
Patient has chosen to leave the hospital against medical advice. The attending physician has not discharged the patient. Patient is not a risk to himself or others. I have discussed with the patient the following: Physician has not determined patient is ready for discharge. Risks and consequences of leaving the hospital too soon and the benefit of continued hospitalization. Patient reports he wants to leave, so he can  his daughter.     Discharge against medical advice has been signed.    ERP has been notified.

## 2022-12-05 NOTE — ED TRIAGE NOTES
"Chief Complaint   Patient presents with    Exposure to STD     Pt wants to get check for HIV, chlamydia and gonorrhea. States he's having a lot of anxiety about it      Protocol ordered    BP (!) 140/81   Pulse 80   Temp 35.9 °C (96.7 °F) (Temporal)   Resp 16   Ht 1.803 m (5' 11\")   Wt 85.8 kg (189 lb 2.5 oz)   SpO2 99%   BMI 26.38 kg/m²     "

## 2022-12-06 LAB
RPR SER QL: REACTIVE
RPR SER-TITR: ABNORMAL {TITER}

## 2022-12-08 NOTE — ED NOTES
"ED Positive Culture Follow-up/Notification Note:    Date: 12/7/2022     Patient seen in the ED on 12/4/2022 for Exposure to STI. Specifically, patient wanted testing for HIV, chlamydia, and gonorrhea. Blood work was drawn, but patient left AMA before being seen by physician. Unclear if patient has new symptoms or positive partners. Patient has long history of presentation to the ED with concerns for STI, and numerous reactive RPR and treponemal tests, with titers 1:2. Received Bicillin most recently on 9/30 in the ED. Also had titer of 1:2 on 11/6, and was experiencing swollen lymph nodes even days after presentation. After that visit, patient stated he was going to seek follow up at [facility], who is also familiar with patient.  1. STD exposure       Discharge Medication List as of 12/4/2022  7:41 PM          Allergies: Patient has no known allergies.     Vitals:    12/04/22 1845 12/04/22 1848   BP: (!) 140/81    Pulse: 80    Resp: 16    Temp: 35.9 °C (96.7 °F)    TempSrc: Temporal    SpO2: 99%    Weight:  85.8 kg (189 lb 2.5 oz)   Height:  1.803 m (5' 11\")       Final cultures:   Results       Procedure Component Value Units Date/Time    Chlamydia/GC, PCR (Urine) [[id number]] Collected: 12/04/22 1910    Order Status: Completed Specimen: Urine Updated: 12/05/22 1859     C. trachomatis by PCR Negative     Gc By Dna Probe Negative     Source Urine            Plan:   Patient is negative for HIV, gonorrhea, and chlamydia. Again, patient has reactive RPR and treponemal tests, typical for a patient with a history of infection and treatment. Titer is 1:4. Per CDC guidance, 10-20% of patients will not experience four-fold decreases in titers within 12 months, a measure typically associated with successful treatment. Additionally, titers <1:8 are less likely to demonstrate this decrease. This titer does not necessarily indicate treatment failure. Patient was not evaluated for signs and symptoms of new syphilis infection. " Called patient to discuss symptoms and concerns leading to presentation. Left a voice mail. Will also send a letter to notify patient of result and ask him to call back if he has concerns.    Wyatt Osullivan, PharmD   ADDENDUM: Patient called back. He is primarily anxious about HIV from a prior exposure. Does not have any new complaints of syphilis-related symptoms such a sores. I informed him of his results, encouraged him to engage in protected sex and establish care with a primary care provider in order to avoid the anxiety he's been experiencing. He was open to the suggestions.

## 2024-01-23 ENCOUNTER — HOSPITAL ENCOUNTER (EMERGENCY)
Facility: MEDICAL CENTER | Age: 39
End: 2024-01-23
Attending: STUDENT IN AN ORGANIZED HEALTH CARE EDUCATION/TRAINING PROGRAM
Payer: COMMERCIAL

## 2024-01-23 VITALS
SYSTOLIC BLOOD PRESSURE: 125 MMHG | DIASTOLIC BLOOD PRESSURE: 80 MMHG | WEIGHT: 171.74 LBS | TEMPERATURE: 98.4 F | HEART RATE: 68 BPM | RESPIRATION RATE: 14 BRPM | OXYGEN SATURATION: 98 % | BODY MASS INDEX: 24.04 KG/M2 | HEIGHT: 71 IN

## 2024-01-23 DIAGNOSIS — Z20.2 STD EXPOSURE: ICD-10-CM

## 2024-01-23 LAB
HIV 1+2 AB+HIV1 P24 AG SERPL QL IA: NORMAL
T PALLIDUM AB SER QL IA: REACTIVE

## 2024-01-23 PROCEDURE — 86592 SYPHILIS TEST NON-TREP QUAL: CPT

## 2024-01-23 PROCEDURE — 99283 EMERGENCY DEPT VISIT LOW MDM: CPT

## 2024-01-23 PROCEDURE — 36415 COLL VENOUS BLD VENIPUNCTURE: CPT

## 2024-01-23 PROCEDURE — 86593 SYPHILIS TEST NON-TREP QUANT: CPT

## 2024-01-23 PROCEDURE — 87389 HIV-1 AG W/HIV-1&-2 AB AG IA: CPT

## 2024-01-23 PROCEDURE — 87491 CHLMYD TRACH DNA AMP PROBE: CPT

## 2024-01-23 PROCEDURE — 86780 TREPONEMA PALLIDUM: CPT

## 2024-01-23 PROCEDURE — 87591 N.GONORRHOEAE DNA AMP PROB: CPT

## 2024-01-24 LAB
C TRACH DNA SPEC QL NAA+PROBE: NEGATIVE
N GONORRHOEA DNA SPEC QL NAA+PROBE: NEGATIVE
RPR SER QL: REACTIVE
RPR SER-TITR: NORMAL {TITER}
SPECIMEN SOURCE: NORMAL

## 2024-01-24 NOTE — ED PROVIDER NOTES
"ED Provider Note    CHIEF COMPLAINT  Chief Complaint   Patient presents with    Exposure to STD     Pt states his girlfriend stated she had a +STD but wouldn't tell him what it was- pt states he does currently have syphilis, but \"her numbers went up\"       EXTERNAL RECORDS REVIEWED  Other pharmacist note on 12/7/2022 for positive RPR.  Patient reportedly received Bicillin on 9/30/2022.    HPI/ROS  LIMITATION TO HISTORY   Select: : None  OUTSIDE HISTORIAN(S):    Meek Wood is a 38 y.o. male who presents with concern about STD exposure given that his girlfriend was exposed to a new sexual transmitted infection.  Patient denies any symptoms at this time.  Patient denies dysuria, hematuria, genital swelling, groin swelling, new rashes, constitutional symptoms, fevers chills body aches or sweats.    PAST MEDICAL HISTORY   has a past medical history of Syphilis (2021).    SURGICAL HISTORY  patient denies any surgical history    FAMILY HISTORY  No family history on file.    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   Constitutional:       Comments: Patient is lying in bed supine, pleasant, conversant, speaking in complete " sentences   HENT:      Head: Normocephalic and atraumatic.   Eyes:      Extraocular Movements: Extraocular movements intact.      Conjunctiva/sclera: Conjunctivae normal.      Pupils: Pupils are equal, round, and reactive to light.   Cardiovascular:      Pulses: Normal pulses.      Comments: HR 68  Pulmonary:      Effort: Pulmonary effort is normal. No respiratory distress.   Musculoskeletal:         General: No swelling. Normal range of motion.      Cervical back: Normal range of motion. No rigidity.   Skin:     General: Skin is warm and dry.      Capillary Refill: Capillary refill takes less than 2 seconds.   Neurological:      Mental Status: Alert.       DIAGNOSTIC STUDIES / PROCEDURES      LABS  Troponin with positive, RPR pending    COURSE & MEDICAL DECISION MAKING      INITIAL ASSESSMENT, COURSE AND PLAN  Care Narrative: Patient preferred not to be roomed and would only like to have a quick lab testing.  Due to this I was unable to perform full genital exam.  Nevertheless given patient's history and well appearance it does.  The patient is suffering from any acute infectious etiology symptomatically at this time.  Syphilis, HIV, chlamydia pending.  Disposition pending lab workup.    Electronically signed by: Diego Craig M.D., 1/23/2024 9:48 PM    Patient does not want to wait for his lab work.  Patient reports he will come back to the ER or follow-up outpatient with PCP.  Patient with no new symptoms, will administer Bicillin should patient have a rising RPR titer.    Repeat physical exam benign.  I doubt any serious emergency process at this time.  Patient and/or family, friends given strict return precautions for worsening symptoms and care instructions. They have demonstrated understanding of discharge instructions through teach back mechanism. Advised PCP follow-up in 1-2 days.  Patient/family/friend expresses understanding and agrees to plan.    This dictation has been created using voice  recognition software. I am continuously working with the software to minimize the number of voice recognition errors and I have made every attempt to manually correct the errors within my dictation. However errors  related to this voice recognition software may still exist and should be interpreted within the appropriate context.     Electronically signed by: Diego Craig M.D., 1/23/2024 10:34 PM    DISPOSITION AND DISCUSSIONS      Escalation of care considered, and ultimately not performed:after discussion with the patient / family, they have elected to decline an escalation in care    Barriers to care at this time, including but not limited to: Patient does not have established PCP.     Decision tools and prescription drugs considered including, but not limited to: Antibiotics not indicated in the absence of confirmed bacterial infection .    FINAL DIAGNOSIS  1. STD exposure           Electronically signed by: Diego Craig M.D., 1/23/2024 9:46 PM

## 2024-01-24 NOTE — ED TRIAGE NOTES
"Chief Complaint   Patient presents with    Exposure to STD     Pt states his girlfriend stated she had a +STD but wouldn't tell him what it was- pt states he does currently have syphilis, but \"her numbers went up\"       Pt to triage with steady gait for above complaint. Presents with exposure to STD, unsure if his girlfriend was exposed to a new STD or not. Requesting to be checked for all possible exposures.    Pt back to lobby, educated on triage process and encourage to alert staff of any changes.     Vitals:    01/23/24 2000   BP: 129/78   Pulse: 65   Resp: 16   Temp: 36.5 °C (97.7 °F)   SpO2: 98%           "

## 2024-01-24 NOTE — ED NOTES
Discharge education provided. Discharge paperwork reviewed with pt. All questions answered. All belongings with pt. Pt ambulated to lobby unassisted with steady gait.

## 2024-01-25 NOTE — ED NOTES
"ED Positive Culture Follow-up/Notification Note:    Date: 01/25/2024   Patient seen in the ED on 1/23/2024 for STI exposure. Patient reports that girlfriend was recently exposed to a new STI. Patient denies dysuria, hematuria, genital/groin swelling, rashes, fever/chills, body aches, or sweats. Patient has a previous history of syphilis. Previous titers have returned as 1:2 to 1:4.  In the ED patient was afebrile with stable vitals. Patient received no IM PCN in the ED.   1. STD exposure       Discharge Medication List as of 1/23/2024 10:16 PM        Allergies: Patient has no known allergies.     Vitals:    01/23/24 2000 01/23/24 2027 01/23/24 2100   BP: 129/78  125/80   Pulse: 65  68   Resp: 16  14   Temp: 36.5 °C (97.7 °F)  36.9 °C (98.4 °F)   TempSrc: Temporal  Temporal   SpO2: 98%  98%   Weight:  77.9 kg (171 lb 11.8 oz)    Height:  1.803 m (5' 11\")      Final cultures:   Results       Procedure Component Value Units Date/Time    Chlamydia/GC, PCR (Urine) [[id number]] Collected: 01/23/24 2055    Order Status: Completed Specimen: Urine Updated: 01/24/24 1725     C. trachomatis by PCR Negative     Gc By Dna Probe Negative     Source Urine           Latest Reference Range & Units 01/23/24 20:44   Rapid Plasma Reagin -Rpr- Non Reactive  Reactive !   Syphilis, Treponemal Qual Non-Reactive  Reactive !   RPR Titer  1:2     Plan:   Patient's treponemal and RPR returned reactive, titer of 1:2. Patient was last treated for syphilis on 9/30/2022 with a previous titer of 1:2, with no decrease in titer from this point labs are indicative of either a new infection or incomplete treatment.  Contacted patient and recommended returning for treatment with IM penicillin G benzathine 2.4 million units. Counseled the patient to abstain from sexual intercourse 7 days after antibiotic therapy is completed, to notify any sexual partners within the last 90 days and to go to the [facility] for testing and information. Patient " also encouraged to follow up with the [facility] 6 months after treatment for re-evaluation of labs for treatment efficacy. Patient reports understanding and plans to return for treatment.   Garrett Purvis, PharmD

## 2024-08-17 ENCOUNTER — PHARMACY VISIT (OUTPATIENT)
Dept: PHARMACY | Facility: MEDICAL CENTER | Age: 39
End: 2024-08-17
Payer: COMMERCIAL

## 2024-08-17 ENCOUNTER — HOSPITAL ENCOUNTER (EMERGENCY)
Facility: MEDICAL CENTER | Age: 39
End: 2024-08-17
Attending: EMERGENCY MEDICINE
Payer: COMMERCIAL

## 2024-08-17 VITALS
RESPIRATION RATE: 16 BRPM | OXYGEN SATURATION: 97 % | DIASTOLIC BLOOD PRESSURE: 61 MMHG | TEMPERATURE: 97.4 F | SYSTOLIC BLOOD PRESSURE: 119 MMHG | BODY MASS INDEX: 22.66 KG/M2 | WEIGHT: 162.48 LBS | HEART RATE: 78 BPM

## 2024-08-17 DIAGNOSIS — R36.9 PENILE DISCHARGE: ICD-10-CM

## 2024-08-17 LAB
HIV 1+2 AB+HIV1 P24 AG SERPL QL IA: NORMAL
RPR SER QL: REACTIVE
RPR SER-TITR: NORMAL {TITER}
T PALLIDUM AB SER QL IA: REACTIVE

## 2024-08-17 PROCEDURE — 86592 SYPHILIS TEST NON-TREP QUAL: CPT

## 2024-08-17 PROCEDURE — 87591 N.GONORRHOEAE DNA AMP PROB: CPT

## 2024-08-17 PROCEDURE — 96372 THER/PROPH/DIAG INJ SC/IM: CPT

## 2024-08-17 PROCEDURE — 87389 HIV-1 AG W/HIV-1&-2 AB AG IA: CPT

## 2024-08-17 PROCEDURE — RXMED WILLOW AMBULATORY MEDICATION CHARGE: Performed by: EMERGENCY MEDICINE

## 2024-08-17 PROCEDURE — 700111 HCHG RX REV CODE 636 W/ 250 OVERRIDE (IP): Mod: JZ | Performed by: EMERGENCY MEDICINE

## 2024-08-17 PROCEDURE — 86780 TREPONEMA PALLIDUM: CPT

## 2024-08-17 PROCEDURE — 36415 COLL VENOUS BLD VENIPUNCTURE: CPT

## 2024-08-17 PROCEDURE — 86593 SYPHILIS TEST NON-TREP QUANT: CPT

## 2024-08-17 PROCEDURE — 99283 EMERGENCY DEPT VISIT LOW MDM: CPT

## 2024-08-17 PROCEDURE — 87491 CHLMYD TRACH DNA AMP PROBE: CPT

## 2024-08-17 RX ORDER — CEFTRIAXONE 500 MG/1
500 INJECTION, POWDER, FOR SOLUTION INTRAMUSCULAR; INTRAVENOUS ONCE
Status: COMPLETED | OUTPATIENT
Start: 2024-08-17 | End: 2024-08-17

## 2024-08-17 RX ORDER — DOXYCYCLINE 100 MG/1
100 CAPSULE ORAL 2 TIMES DAILY
Qty: 14 CAPSULE | Refills: 0 | Status: ACTIVE | OUTPATIENT
Start: 2024-08-17 | End: 2024-08-24

## 2024-08-17 RX ADMIN — CEFTRIAXONE SODIUM 500 MG: 500 INJECTION, POWDER, FOR SOLUTION INTRAMUSCULAR; INTRAVENOUS at 10:41

## 2024-08-17 ASSESSMENT — PAIN DESCRIPTION - PAIN TYPE: TYPE: ACUTE PAIN

## 2024-08-17 ASSESSMENT — LIFESTYLE VARIABLES: DO YOU DRINK ALCOHOL: NO

## 2024-08-17 NOTE — ED NOTES
Pt ambulatory to Y54, steady gait.  Agree with triage assessment; denies pain.  Chart up for ERP.

## 2024-08-17 NOTE — ED NOTES
Meek Wood discharged via ambulaiton with self.  Discharge instructions given and reviewed, patient educated to follow up with PCP, verbalized understanding.  Prescriptions given x 1 for pickup at pharm.  All personal belongings in possession.  No questions at this time.

## 2024-08-17 NOTE — ED PROVIDER NOTES
ED Provider Note    CHIEF COMPLAINT  Chief Complaint   Patient presents with    Penile Discharge     Started Thursday.  Pt report no UPI x 1 month.       EXTERNAL RECORDS REVIEWED  Outpatient labs & studies previous positive RPR    HPI/ROS  LIMITATION TO HISTORY   Select: : None      Meek Wood is a 39 y.o. male who presents complaining of some discharge from his penis.  He noticed this on Thursday.  He describes a whitish discharge at the tip of his penis.  It comes and goes since that time.  On Wednesday or Thursday he had a minimal amount of pain in his penis, he has had none since.  He has no other complaints.  There is been no fever, sore throat, chest pain or shortness of breath.  No cough or cold symptoms.  No belly pain.  No change in bowel.  He has had no difficulty with urination.  No rashes.  He has not had any recent unprotected sex, no new sexual partners.  However, he is concerned about the possibility of an STD.    PAST MEDICAL HISTORY   has a past medical history of Syphilis (2021).    SURGICAL HISTORY  patient denies any surgical history    FAMILY HISTORY  No family history on file.    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  HENT: Mucous membranes are moist.  Eyes: Sclerae are nonicteric, pupils are equally round.  Abdomen: Bowel sounds normal, soft, non-distended, nontender, no mass nor pulsatile mass. I do not appreciate hepatosplenomegaly.  : Uncircumcised phallus.  No discharge presently.  Scrotum is benign.  Skin: No apparent rash.   Psychiatric: Normal for situation      EKG/LABS  Labs are pending, in process  COURSE & MEDICAL DECISION MAKING    ASSESSMENT, COURSE AND PLAN  Care Narrative: This is a young man who presents with penile discharge.  Given this history, I think it would be prudent to treat him empirically.  We talked about this, and he is agreeable.  I will give him ceftriaxone here and doxycycline prescription.  Instructions on urethritis.    DISPOSITION AND DISCUSSIONS  Decision tools and prescription drugs considered including, but not limited to: Antibiotics   .    FINAL DIAGNOSIS  1. Penile discharge         Electronically signed by: Herson Ji M.D., 8/17/2024 9:54 AM

## 2024-08-17 NOTE — ED TRIAGE NOTES
Chief Complaint   Patient presents with    Penile Discharge     Started Thursday.  Pt report no UPI x 1 month.     Pt has h/o syphyllis with treatment.

## 2024-08-17 NOTE — DISCHARGE INSTRUCTIONS
Like we talked about, although your tests are still in process, I would like to treat you for infection.

## 2024-08-21 NOTE — ED NOTES
ED Positive Culture Follow-up/Notification Note:    Date: 8/21/2024     Patient seen in the ED on 8/17/2024 for penile discharge. Patient reported whitish discharge with minimal pain. He denied recent unprotected sex and new sexual partners, however he expressed concerned for possible STD. Of note, he has a syphilis diagnosis in 2021 and was treated in 2022. He also had a reactive RPR in January of 2024 with RPR titer 1:2. Physical exam was negative for discharge or rash.   1. Penile discharge     In the ED, patient received ceftriaxone 500 mg IM x 1 dose  Discharge Medication List as of 8/17/2024 10:49 AM        START taking these medications    Details   doxycycline (MONODOX) 100 MG capsule Take 1 Capsule by mouth 2 times a day for 7 days., Disp-14 Capsule, R-0, Normal             Allergies: Patient has no known allergies.     Vitals:    08/17/24 0905 08/17/24 0908 08/17/24 0936 08/17/24 1043   BP: 128/66  124/61 119/61   Pulse: 88  89 78   Resp: 16  16    Temp: 36.3 °C (97.4 °F)   36.3 °C (97.4 °F)   TempSrc: Temporal   Temporal   SpO2: 97%  97% 97%   Weight:  73.7 kg (162 lb 7.7 oz)         Final cultures:   Results       Procedure Component Value Units Date/Time    Chlamydia/GC, PCR (Urine) [[id number]] Collected: 08/17/24 0935    Order Status: Completed Specimen: Urine Updated: 08/18/24 1559     C. trachomatis by PCR Negative     Gc By Dna Probe Negative     Source Urine           Latest Reference Range & Units 01/23/24 20:44 08/17/24 09:15   HIV Ag/Ab Combo Assay Non Reactive  Non-Reactive Non-Reactive   Rapid Plasma Reagin -Rpr- Non Reactive  Reactive ! Reactive !   Syphilis, Treponemal Qual Non-Reactive  Reactive ! Reactive !   RPR Titer  1:2 1:2   !: Data is abnormal      Plan:   Since RPR was reactive but the RPR titer was unchanged from January and patient does not have active symptoms, this is not an active infection so treatment is not indicated. Continue empiric treatment for urethritis.     Cecily Perez  PharmD